# Patient Record
Sex: FEMALE | Race: OTHER
[De-identification: names, ages, dates, MRNs, and addresses within clinical notes are randomized per-mention and may not be internally consistent; named-entity substitution may affect disease eponyms.]

---

## 2017-01-04 ENCOUNTER — HOSPITAL ENCOUNTER (EMERGENCY)
Dept: HOSPITAL 54 - ER | Age: 57
LOS: 1 days | Discharge: HOME | End: 2017-01-05
Payer: MEDICAID

## 2017-01-04 VITALS — WEIGHT: 150 LBS | BODY MASS INDEX: 29.45 KG/M2 | HEIGHT: 60 IN

## 2017-01-04 DIAGNOSIS — R50.9: Primary | ICD-10-CM

## 2017-01-04 DIAGNOSIS — E05.90: ICD-10-CM

## 2017-01-04 DIAGNOSIS — J32.9: ICD-10-CM

## 2017-01-04 PROCEDURE — Z7610: HCPCS

## 2017-01-04 PROCEDURE — A4606 OXYGEN PROBE USED W OXIMETER: HCPCS

## 2017-01-04 PROCEDURE — 99283 EMERGENCY DEPT VISIT LOW MDM: CPT

## 2017-01-04 PROCEDURE — 71010: CPT

## 2017-01-05 VITALS — SYSTOLIC BLOOD PRESSURE: 119 MMHG | DIASTOLIC BLOOD PRESSURE: 75 MMHG

## 2019-08-05 ENCOUNTER — HOSPITAL ENCOUNTER (EMERGENCY)
Dept: HOSPITAL 10 - FTE | Age: 59
Discharge: HOME | End: 2019-08-05
Payer: COMMERCIAL

## 2019-08-05 ENCOUNTER — HOSPITAL ENCOUNTER (EMERGENCY)
Dept: HOSPITAL 91 - FTE | Age: 59
Discharge: HOME | End: 2019-08-05
Payer: COMMERCIAL

## 2019-08-05 VITALS
BODY MASS INDEX: 23.85 KG/M2 | WEIGHT: 126.32 LBS | HEIGHT: 61 IN | HEIGHT: 61 IN | BODY MASS INDEX: 23.85 KG/M2 | WEIGHT: 126.32 LBS

## 2019-08-05 VITALS — HEART RATE: 71 BPM | SYSTOLIC BLOOD PRESSURE: 143 MMHG | DIASTOLIC BLOOD PRESSURE: 74 MMHG | RESPIRATION RATE: 18 BRPM

## 2019-08-05 DIAGNOSIS — H81.12: Primary | ICD-10-CM

## 2019-08-05 DIAGNOSIS — R11.10: ICD-10-CM

## 2019-08-05 PROCEDURE — 99283 EMERGENCY DEPT VISIT LOW MDM: CPT

## 2019-08-05 RX ADMIN — MECLIZINE 1 MG: 12.5 TABLET ORAL at 15:37

## 2019-08-05 RX ADMIN — ONDANSETRON 1 MG: 4 TABLET, ORALLY DISINTEGRATING ORAL at 15:37

## 2019-08-05 NOTE — ERD
ER Documentation


Chief Complaint


Chief Complaint





DIZZINESS AND VOMITTING





HPI


This is a 58-year-old female patient who presents emergency room with complaint 


of dizziness that started yesterday when she went to get out of bed, patient 


states the room started spinning and has had intermittent dizziness since 


yesterday.  No weakness, no slurred speech, no visual deficits.  Patient states 


she had episode one time 2 weeks before when she also was experiencing URI type 


symptoms.  No chronic medical problems.  Patient with no pronator drift, clear 


speech, steady gait at time of evaluation.  Denies fevers, no dysuria, nausea 


when turning head to the left with start of vertigo.





ROS


All systems reviewed and are negative except as per history of present illness.





Medications


Home Meds


Active Scripts


Meclizine Hcl* (Antivert*) 12.5 Mg Tab, 25 MG PO Q6H PRN for DIZZINESS for 10 


Days, #20 TAB


   Prov:SUDHIR TRISTAN NP         8/5/19


Ondansetron (Ondansetron Odt) 4 Mg Tab.rapdis, 4 MG PO Q6H PRN for NAUSEA AND/OR


VOMITING for 5 Days, #10 TAB


   Prov:SUDHIR TRISTAN NP         8/5/19


Reported Medications


Travoprost* (Travatan*) 0.004%-2.5 Ml Opht, 1 DROP BOTH EYES HS, EA


   12/31/14


Levothyroxine Sodium* (Levothyroxine Sodium*) 50 Mcg Tablet, 50 MCG PO DAILY, 


TAB


   12/31/14





Allergies


Allergies:  


Coded Allergies:  


     No Known Drug Allergies (Verified  Allergy, Mild, 12/30/14)





PMhx/Soc


History of Surgery:  No


Anesthesia Reaction:  No


Hx Neurological Disorder:  Yes (CVA X 2 in 2012)


Hx Respiratory Disorders:  No


Hx Cardiac Disorders:  No


Hx Psychiatric Problems:  No


Hx Miscellaneous Medical Probl:  Yes (GLAUCOMA, HYPOTHYROIDISM)


Hx Alcohol Use:  No


Hx Substance Use:  No


Hx Tobacco Use:  No


Smoking Status:  Never smoker





FmHx


Family History:  No diabetes, No coronary disease, No other





Physical Exam


Vitals





Vital Signs


  Date      Temp  Pulse  Resp  B/P (MAP)   Pulse Ox  O2          O2 Flow    FiO2


Time                                                 Delivery    Rate


    8/5/19  98.4     71    18      143/74        96


     14:04                           (97)





Physical Exam


Const:   No acute distress


Head:   Atraumatic 


Eyes:    Normal Conjunctiva, PERRL, +left nystagmus


ENT:    Normal External Ears, Nose and Mouth.  Pharynx pink, moist, no lesions, 


no exudate, no petechiae


Neck:               Full range of motion. No meningismus.  No lymphadenopathy


Resp:   Clear to auscultation bilaterally


Cardio:   Regular rate and rhythm, no murmurs


Abd:    Soft, non tender, non distended. Normal bowel sounds


Skin:   No petechiae or rashes


Back:   No midline or flank tenderness, no CVT


Ext:    No cyanosis, or edema


Neur:   Awake and alert, CN II-XII intact, clear speech, steady gait, neg 


Romberg


Psych:    Normal Mood and Affect


Results 24 hrs





Current Medications


 Medications
   Dose
          Sig/Yaz
       Start Time
   Status  Last


 (Trade)       Ordered        Route
 PRN     Stop Time              Admin
Dose


                              Reason                                Admin


 Meclizine      25 mg          ONCE  ONCE
    8/5/19        DC            8/5/19


HCl
                          PO
            15:30
 8/5/19                15:37



(Antivert)                                   15:31


 Ondansetron    4 mg           ONCE  STAT
    8/5/19        DC            8/5/19


HCl
  (Zofran                 ODT
           15:23
 8/5/19                15:37



Odt)                                         15:25








Procedures/MDM


PROCEDURES/MDM








-Medications:  


Zofran, meclizine


Patient tolerated medication well with no adverse reactions. Patient reported 


improvement in nausea. 











MDM:


This is a 58-year-old female patient presents emergency room with complaint of 


vertigo type symptoms where she complains of room spinning sensation when she 


moves her head to the left starting yesterday upon getting out of bed.  Patient 


states she also had recent URI type symptoms.  Patient symptoms not 100% 


resolved but markedly improved with use of Zofran and meclizine.  Patient states


she feels safe to go home at this time and follow-up with primary care visit 


physician.  Patient has been given instructions on use of meclizine and Epley 


maneuver.


Clinical and diagnostic exam not suggestive of infection, intracranial process, 


SAH, SDH, neoplasm, meningitis, encephalitis, aneurysm, thrombus, temporal 


arteritis, sinusitis.


The patient has been provided with instructions on self-care including use of 


analgesia, reducing triggers, and need for close follow-up with primary care 


physician within 1-2 days for reevaluation. 


The patient has been instructed to return immediately for worsening symptoms, 


change in pattern of current symptoms, or other acute problems.








DISPOSITION and PLAN: 


RX: Meclizine, Zofran


The patient has been discharge home to follow-up with community physician.





Departure


Diagnosis:  


   Primary Impression:  


   BPPV (benign paroxysmal positional vertigo)


   Laterality:  left  Qualified Codes:  H81.12 - Benign paroxysmal vertigo, left


   ear


Condition:  Stable











SUDHIR TRISTAN NP               Aug 5, 2019 15:41